# Patient Record
Sex: FEMALE | Race: BLACK OR AFRICAN AMERICAN | URBAN - METROPOLITAN AREA
[De-identification: names, ages, dates, MRNs, and addresses within clinical notes are randomized per-mention and may not be internally consistent; named-entity substitution may affect disease eponyms.]

---

## 2017-12-20 ENCOUNTER — OFFICE VISIT (OUTPATIENT)
Dept: FAMILY MEDICINE | Facility: CLINIC | Age: 22
End: 2017-12-20
Payer: COMMERCIAL

## 2017-12-20 VITALS
HEART RATE: 76 BPM | WEIGHT: 146.8 LBS | HEIGHT: 66 IN | TEMPERATURE: 99.9 F | BODY MASS INDEX: 23.59 KG/M2 | SYSTOLIC BLOOD PRESSURE: 114 MMHG | DIASTOLIC BLOOD PRESSURE: 68 MMHG

## 2017-12-20 DIAGNOSIS — D64.9 ANEMIA, UNSPECIFIED TYPE: ICD-10-CM

## 2017-12-20 DIAGNOSIS — L30.9 ECZEMA, UNSPECIFIED TYPE: Primary | ICD-10-CM

## 2017-12-20 LAB
RETICS # AUTO: 37.3 10E9/L (ref 25–95)
RETICS/RBC NFR AUTO: 0.8 % (ref 0.5–2)

## 2017-12-20 PROCEDURE — 83540 ASSAY OF IRON: CPT | Performed by: NURSE PRACTITIONER

## 2017-12-20 PROCEDURE — 36415 COLL VENOUS BLD VENIPUNCTURE: CPT | Performed by: NURSE PRACTITIONER

## 2017-12-20 PROCEDURE — 99203 OFFICE O/P NEW LOW 30 MIN: CPT | Performed by: NURSE PRACTITIONER

## 2017-12-20 PROCEDURE — 85045 AUTOMATED RETICULOCYTE COUNT: CPT | Performed by: NURSE PRACTITIONER

## 2017-12-20 PROCEDURE — 82728 ASSAY OF FERRITIN: CPT | Performed by: NURSE PRACTITIONER

## 2017-12-20 PROCEDURE — 83550 IRON BINDING TEST: CPT | Performed by: NURSE PRACTITIONER

## 2017-12-20 PROCEDURE — 82746 ASSAY OF FOLIC ACID SERUM: CPT | Performed by: NURSE PRACTITIONER

## 2017-12-20 PROCEDURE — 85025 COMPLETE CBC W/AUTO DIFF WBC: CPT | Performed by: NURSE PRACTITIONER

## 2017-12-20 RX ORDER — TRIAMCINOLONE ACETONIDE 1 MG/G
CREAM TOPICAL
Qty: 80 G | Refills: 1 | Status: SHIPPED | OUTPATIENT
Start: 2017-12-20

## 2017-12-20 RX ORDER — PREDNISONE 20 MG/1
TABLET ORAL
Qty: 15 TABLET | Refills: 0 | Status: SHIPPED | OUTPATIENT
Start: 2017-12-20

## 2017-12-20 ASSESSMENT — ENCOUNTER SYMPTOMS
SHORTNESS OF BREATH: 0
DIZZINESS: 0
EYE DISCHARGE: 0
EYE ITCHING: 0
DIARRHEA: 0
FATIGUE: 0
WHEEZING: 0
SINUS PRESSURE: 0
NAUSEA: 0
COUGH: 0
LIGHT-HEADEDNESS: 0
SORE THROAT: 0
RHINORRHEA: 0
CHILLS: 0
FEVER: 0
HEADACHES: 0
CONSTIPATION: 0
DIAPHORESIS: 0

## 2017-12-20 NOTE — NURSING NOTE
"Chief Complaint   Patient presents with     Pruritis       Initial /68 (BP Location: Right arm, Patient Position: Sitting, Cuff Size: Adult Regular)  Pulse 76  Temp 99.9  F (37.7  C) (Tympanic)  Ht 5' 5.5\" (1.664 m)  Wt 146 lb 12.8 oz (66.6 kg)  LMP 12/02/2017  BMI 24.06 kg/m2 Estimated body mass index is 24.06 kg/(m^2) as calculated from the following:    Height as of this encounter: 5' 5.5\" (1.664 m).    Weight as of this encounter: 146 lb 12.8 oz (66.6 kg).  Medication Reconciliation: complete     April BIRDIE [place]      "

## 2017-12-20 NOTE — PATIENT INSTRUCTIONS
These are general instructions and may not be specific to you. Please call, email or follow up if you have any questions or concerns.     Warm baths/showers, not hot baths/showers  Use thick moisturizing cream in a jar like Aquaphilic, Cetaphil, or Eucerin       Atopic Dermatitis (Adult) ECZEMA   Atopic dermatitis is a dry, itchy, red rash. It s also called eczema. The rash is chronic, or ongoing. It can come and go over time. The disease is often passed down in families. It causes a problem with the skin barrier that makes the skin more sensitive to the environment and other factors. The increased skin sensitivity causes an itch, which causes scratching. Scratching can worsen the itching or also break the skin. This can put the skin at risk of infection.  The condition is most common in people with asthma, hay fever, hives, or dry or sensitive skin. The rash may be caused by extreme heat or heavy sweating. Skin irritants can cause the rash to flare up. These can include wool or silk clothing, grease, oils, some medicines, and harsh soaps and detergents. Emotional stress can also be a trigger.  Treatment is done to relieve the itching and inflammation of the skin.  Home care  Follow these tips to care for your condition:    Keep the areas of rash clean by bathing at least every other day. Use lukewarm water to bathe. Don t use hot water, which can dry out the skin.    Don t use soaps with strong detergents. Use mild soaps made for sensitive skin.    Apply a cream or ointment to damp skin right after bathing.    Avoid things that irritate your skin. Wear absorbent, soft fabrics next to the skin rather than rough or scratchy materials.    Use mild laundry soap free of scents and perfumes. Make sure to rinse all the soap out of your clothes.    Treat any skin infection as directed.    Use oral diphenhydramine to help reduce itching. This is an antihistamine you can buy at drug and grocery stores. It can make you  sleepy, so use lower doses during the daytime. Or you can use loratadine. This is an antihistamine that will not make you sleepy. Do not use diphenhydramine if you have glaucoma or have trouble urinating due to an enlarged prostate.  Follow-up care  See your healthcare provider, or as advised. If your symptoms don t get better or if they get worse in the next 7 days, make an appointment with your healthcare provider.  When to seek medical advice  Call your healthcare provider right away  if any of these occur:    Increasing area of redness or pain in the skin    Yellow crusts or wet drainage from the rash    Fever of 100.4 F (38 C) or higher, or as directed by your healthcare provider  Date Last Reviewed: 9/1/2016 2000-2017 [employer]. [address] [place] [address], [place] [address]. All rights reserved. This information is not intended as a substitute for professional medical care. Always follow your healthcare professional's instructions.

## 2017-12-20 NOTE — MR AVS SNAPSHOT
After Visit Summary   12/20/2017    Krystle Toro    MRN: [id number]           Patient Information     Date Of Birth          1995        Visit Information        Provider Department      12/20/2017 2:40 PM Savannah Jaime APRN [facility]        Today's Diagnoses     Eczema, unspecified type    -  1    Anemia, unspecified type          Care Instructions    These are general instructions and may not be specific to you. Please call, email or follow up if you have any questions or concerns.     Warm baths/showers, not hot baths/showers  Use thick moisturizing cream in a jar like Aquaphilic, Cetaphil, or Eucerin       Atopic Dermatitis (Adult) ECZEMA   Atopic dermatitis is a dry, itchy, red rash. It s also called eczema. The rash is chronic, or ongoing. It can come and go over time. The disease is often passed down in families. It causes a problem with the skin barrier that makes the skin more sensitive to the environment and other factors. The increased skin sensitivity causes an itch, which causes scratching. Scratching can worsen the itching or also break the skin. This can put the skin at risk of infection.  The condition is most common in people with asthma, hay fever, hives, or dry or sensitive skin. The rash may be caused by extreme heat or heavy sweating. Skin irritants can cause the rash to flare up. These can include wool or silk clothing, grease, oils, some medicines, and harsh soaps and detergents. Emotional stress can also be a trigger.  Treatment is done to relieve the itching and inflammation of the skin.  Home care  Follow these tips to care for your condition:    Keep the areas of rash clean by bathing at least every other day. Use lukewarm water to bathe. Don t use hot water, which can dry out the skin.    Don t use soaps with strong detergents. Use mild soaps made for sensitive skin.    Apply a cream or ointment to damp skin right after  bathing.    Avoid things that irritate your skin. Wear absorbent, soft fabrics next to the skin rather than rough or scratchy materials.    Use mild laundry soap free of scents and perfumes. Make sure to rinse all the soap out of your clothes.    Treat any skin infection as directed.    Use oral diphenhydramine to help reduce itching. This is an antihistamine you can buy at drug and grocery stores. It can make you sleepy, so use lower doses during the daytime. Or you can use loratadine. This is an antihistamine that will not make you sleepy. Do not use diphenhydramine if you have glaucoma or have trouble urinating due to an enlarged prostate.  Follow-up care  See your healthcare provider, or as advised. If your symptoms don t get better or if they get worse in the next 7 days, make an appointment with your healthcare provider.  When to seek medical advice  Call your healthcare provider right away  if any of these occur:    Increasing area of redness or pain in the skin    Yellow crusts or wet drainage from the rash    Fever of 100.4 F (38 C) or higher, or as directed by your healthcare provider  Date Last Reviewed: 9/1/2016 2000-2017 [employer]. [address] [place] [address]. All rights reserved. This information is not intended as a substitute for professional medical care. Always follow your healthcare professional's instructions.                Follow-ups after your visit        Your next 10 appointments already scheduled     Dec 20, 2017  2:40 PM CST   [facility] with QUINN Castillo CNP   [facility]    [address] [id number] [phone]              Who to contact     Normal or non-critical lab and imaging results will be communicated to you by MyChart, letter or phone within 4 business days after the clinic has received the results. If you do not hear from us within 7 days, please contact the clinic  "through [employer] or phone. If you have a critical or abnormal lab result, we will notify you by phone as soon as possible.  Submit refill requests through [employer] or call your pharmacy and they will forward the refill request to us. Please allow 3 business days for your refill to be completed.          If you need to speak with a  for additional information , please call: [phone]           Additional Information About Your Visit        [employer] Information     [employer] lets you send messages to your doctor, view your test results, renew your prescriptions, schedule appointments and more. To sign up, go to [email or web address] . Click on \"Log in\" on the left side of the screen, which will take you to the Welcome page. Then click on \"Sign up Now\" on the right side of the page.     You will be asked to enter the access code listed below, as well as some personal information. Please follow the directions to create your username and password.     Your access code is: [email or web address]  Expires: 3/20/2018  2:31 PM     Your access code will  in 90 days. If you need help or a new code, please call your [facility] clinic or [phone].        Care EveryWhere ID     This is your Care EveryWhere ID. This could be used by other organizations to access your [facility] medical records  [id number]        Your Vitals Were     Pulse Temperature Height Last Period BMI (Body Mass Index)       76 99.9  F (37.7  C) (Tympanic) 5' 5.5\" (1.664 m) 2017 24.06 kg/m2        Blood Pressure from Last 3 Encounters:   17 114/68    Weight from Last 3 Encounters:   17 146 lb 12.8 oz (66.6 kg)              We Performed the Following     CBC with platelets differential     Ferritin     Folate     Iron and iron binding capacity     Reticulocyte count          Today's Medication Changes          These changes are accurate as of: 17  2:31 PM.  If you have any questions, ask your nurse or doctor.             "   Start taking these medicines.        Dose/Directions    predniSONE 20 MG tablet   Commonly known as:  DELTASONE   Used for:  Eczema, unspecified type   Started by:  Savannah Jaime APRN CNP        Take 1 tab by mouth in AM and PM for 5 days then 1 tab daily for 3 days and then 1/2 tab daily for 3 days.   Quantity:  15 tablet   Refills:  0       triamcinolone 0.1 % cream   Commonly known as:  KENALOG   Used for:  Eczema, unspecified type   Started by:  Savannah Jaime APRN CNP        Apply sparingly to affected area twice per day until area is resolved.   Quantity:  80 g   Refills:  1            Where to get your medicines      These medications were sent to [facility] [address] [place], [place] - [address] [address] [facility] [address] [facility] [address]  [address] [facility] [address]     Phone:  [phone]     predniSONE 20 MG tablet    triamcinolone 0.1 % cream                Primary Care Provider Office Phone # Fax #    [facility] [phone] [address] [facility] [address]        Equal Access to Services     [facility] AH: Hadii aad ku hadasho Soomaali, waaxda luqadaha, qaybta kaalmada adeegyada, waxay idiin hayaan ademeet alcazar . So [facility] [phone].    ATENCIÓN: Si habla español, tiene a dillon disposición servicios grathermelindoos de asistencia lingüística. Llame al [phone].    We comply with applicable federal civil rights laws and [place] laws. We do not discriminate on the basis of race, color, national origin, age, disability, sex, sexual orientation, or gender identity.            Thank you!     Thank you for choosing [facility]  for your care. Our goal is always to provide you with excellent care. Hearing back from our patients is one way we can continue to improve our services. Please take a few minutes to complete the written survey that you may receive in the mail after your visit with us. Thank you!              Your Updated Medication List - Protect others around you: Learn how to safely use, store and throw away your medicines at www.disposemymeds.org.          This list is accurate as of: 12/20/17  2:31 PM.  Always use your most recent med list.                   Brand Name Dispense Instructions for use Diagnosis    predniSONE 20 MG tablet    DELTASONE    15 tablet    Take 1 tab by mouth in AM and PM for 5 days then 1 tab daily for 3 days and then 1/2 tab daily for 3 days.    Eczema, unspecified type       triamcinolone 0.1 % cream    KENALOG    80 g    Apply sparingly to affected area twice per day until area is resolved.    Eczema, unspecified type

## 2017-12-20 NOTE — PROGRESS NOTES
SUBJECTIVE:   Krystle Toro is a 22 year old female who presents to clinic today for the following health issues:    Encouraged to schedule physical and pap.    Rash      Duration: 1 month    Description  Location: abdomen, back, left arm, eye lids  Itching: severe at night    Intensity:  Mild    Accompanying signs and symptoms: scabs from itching    History (similar episodes/previous evaluation): None    Precipitating or alleviating factors:  New exposures:  Was taking prenatal vitamin-stopped taking  Recent travel: no      Therapies tried and outcome: Vaseline intensified itching        Problem list and histories reviewed & adjusted, as indicated.  Additional history: as documented    Current Outpatient Prescriptions   Medication Sig Dispense Refill     predniSONE (DELTASONE) 20 MG tablet Take 1 tab by mouth in AM and PM for 5 days then 1 tab daily for 3 days and then 1/2 tab daily for 3 days. 15 tablet 0     triamcinolone (KENALOG) 0.1 % cream Apply sparingly to affected area twice per day until area is resolved. 80 g 1     No Known Allergies    Reviewed and updated as needed this visit by clinical staffTobacco  Allergies  Meds  Med Hx  Surg Hx  Fam Hx  Soc Hx      Reviewed and updated as needed this visit by Provider          Rash for the last month. Has been putting vasoline on it and is getting worese. Started on arms and now is to back and belly and eye lids. Started using olay for sensitive skin, already had rash when made this change. Lives with mother and she does not have rash. Had been taking a vit but stopped doing that as well. No wheezing or shorntess of breath when is not expected. No new foods. No recent travel.     Has had anemia in the past, would like to be checked to see if still needs to take iron pills.     ROS:  Review of Systems   Constitutional: Negative for chills, diaphoresis, fatigue and fever.   HENT: Negative for ear discharge, ear pain, hearing loss, rhinorrhea, sinus  "pressure and sore throat.    Eyes: Negative for discharge and itching.   Respiratory: Negative for cough, shortness of breath and wheezing.    Gastrointestinal: Negative for constipation, diarrhea and nausea.   Skin: Positive for rash (bends of arms, around abdomen to back).   Neurological: Negative for dizziness, light-headedness and headaches.         OBJECTIVE:     /68 (BP Location: Right arm, Patient Position: Sitting, Cuff Size: Adult Regular)  Pulse 76  Temp 99.9  F (37.7  C) (Tympanic)  Ht 5' 5.5\" (1.664 m)  Wt 146 lb 12.8 oz (66.6 kg)  LMP 12/02/2017  BMI 24.06 kg/m2  Body mass index is 24.06 kg/(m^2).  Physical Exam   Constitutional: She appears well-developed and well-nourished.   Cardiovascular: Normal rate and regular rhythm.    Pulmonary/Chest: Effort normal and breath sounds normal.   Neurological: She is alert.   Skin: Skin is warm and dry. Rash noted.   Bilat antecubital fossa  Rough, raised, flaking around abdomen and back        ASSESSMENT/PLAN:   1. Eczema, unspecified type  Warm baths/showers, not hot baths/showers  Use thick moisturizing cream in a jar like Aquaphilic, Cetaphil, or Eucerin   Make sure to keep skin dry   - predniSONE (DELTASONE) 20 MG tablet; Take 1 tab by mouth in AM and PM for 5 days then 1 tab daily for 3 days and then 1/2 tab daily for 3 days.  Dispense: 15 tablet; Refill: 0  - triamcinolone (KENALOG) 0.1 % cream; Apply sparingly to affected area twice per day until area is resolved.  Dispense: 80 g; Refill: 1    2. Anemia, unspecified type  Will check labs today   - CBC with platelets differential  - Iron and iron binding capacity  - Reticulocyte count  - Ferritin  - Folate        QUINN Montiel [facility]"

## 2017-12-20 NOTE — LETTER
December 21, 2017      Krystle Toro  [address] [facility]  [place] [address]        Dear ,    You do continue to be anemic.  Has this ever been investigated?  Have you ever had test done to try to figure out why you have anemia?  Need to start taking over-the-counter iron 325 mg orally daily.  Best to take with orange juice to help absorb or may take with over-the-counter vitamin C.  I believe they do make a iron supplement with vitamin C in it as well.  Should resume it daily and plan to recheck your levels again in 3 months.  If you have never had this investigated need to try to figure out why you have anemia.  Restarting the iron may make your stools look dark and can increase your chance for constipation.  Need to make sure that you are drinking plenty of water and eating a higher fiber diet.     If you have any questions or concerns, please call the clinic at the number listed above.       Sincerely,        Savannah Jaime, QUINN CNP/EC CMA

## 2017-12-21 LAB
BASOPHILS # BLD AUTO: 0.1 10E9/L (ref 0–0.2)
BASOPHILS NFR BLD AUTO: 1.2 %
DIFFERENTIAL METHOD BLD: ABNORMAL
EOSINOPHIL # BLD AUTO: 0.3 10E9/L (ref 0–0.7)
EOSINOPHIL NFR BLD AUTO: 5.1 %
ERYTHROCYTE [DISTWIDTH] IN BLOOD BY AUTOMATED COUNT: 18.4 % (ref 10–15)
FERRITIN SERPL-MCNC: 4 NG/ML (ref 12–150)
FOLATE SERPL-MCNC: 19 NG/ML
HCT VFR BLD AUTO: 34.1 % (ref 35–47)
HGB BLD-MCNC: 10.1 G/DL (ref 11.7–15.7)
IMM GRANULOCYTES # BLD: 0 10E9/L (ref 0–0.4)
IMM GRANULOCYTES NFR BLD: 0.4 %
IRON SATN MFR SERPL: 2 % (ref 15–46)
IRON SERPL-MCNC: 10 UG/DL (ref 35–180)
LYMPHOCYTES # BLD AUTO: 1.7 10E9/L (ref 0.8–5.3)
LYMPHOCYTES NFR BLD AUTO: 34.2 %
MCH RBC QN AUTO: 21.8 PG (ref 26.5–33)
MCHC RBC AUTO-ENTMCNC: 29.6 G/DL (ref 31.5–36.5)
MCV RBC AUTO: 74 FL (ref 78–100)
MONOCYTES # BLD AUTO: 0.5 10E9/L (ref 0–1.3)
MONOCYTES NFR BLD AUTO: 8.9 %
NEUTROPHILS # BLD AUTO: 2.5 10E9/L (ref 1.6–8.3)
NEUTROPHILS NFR BLD AUTO: 50.2 %
PLATELET # BLD AUTO: 199 10E9/L (ref 150–450)
RBC # BLD AUTO: 4.64 10E12/L (ref 3.8–5.2)
TIBC SERPL-MCNC: 453 UG/DL (ref 240–430)
WBC # BLD AUTO: 5.1 10E9/L (ref 4–11)